# Patient Record
Sex: MALE | Employment: FULL TIME | URBAN - METROPOLITAN AREA
[De-identification: names, ages, dates, MRNs, and addresses within clinical notes are randomized per-mention and may not be internally consistent; named-entity substitution may affect disease eponyms.]

---

## 2024-09-01 ENCOUNTER — OFFICE VISIT (OUTPATIENT)
Dept: OPHTHALMOLOGY | Facility: CLINIC | Age: 59
End: 2024-09-01
Payer: COMMERCIAL

## 2024-09-01 DIAGNOSIS — S05.02XA ABRASION OF LEFT CORNEA, INITIAL ENCOUNTER: Primary | ICD-10-CM

## 2024-09-01 PROCEDURE — 92002 INTRM OPH EXAM NEW PATIENT: CPT | Performed by: OPHTHALMOLOGY

## 2024-09-01 NOTE — LETTER
9/1/2024      Pedro Coto  [address] [facility] [address]      Dear Colleague,    Thank you for referring your patient, Pedro Coto, to the [facility]. Please see a copy of my visit note below.     Current Eye Medications:  Ocuflox every 2 hours     Subjective:  Foreign body sensation yesterday AM with contact lens left eye.  At 2:30 AM increased pain and presented to [place] ED.  Corneal ulcer diagnosed.  I discussed with ED and arranged to meet patient today urgently.    Daily disposable CLs.  Delta ; next flies Weds 9/4.     Objective:  See Ophthalmology Exam.       Assessment:  Corneal abrasion left eye.  No sign of keratitis.      Plan:  Cyclogyl 1% one drop given left eye.  Moxifloxacin drop left eye four times daily.  Alternate with Erythromycin ointment four times daily left eye.  I will call tomorrow in follow up.    NB: Discussed with patient 9/2/24: Much improved.  Will taper meds and follow up with usual eyecare provider in 1-2 days.  Will call me if any problems.    Hiram Lora M.D.  [phone]      Again, thank you for allowing me to participate in the care of your patient.        Sincerely,        Hiram Lora MD

## 2024-09-14 ASSESSMENT — SLIT LAMP EXAM - LIDS: COMMENTS: NORMAL

## 2024-09-14 ASSESSMENT — VISUAL ACUITY
OD_CC: 20/15
METHOD: SNELLEN - LINEAR
OS_CC+: +2
OS_CC: 20/60

## 2024-09-14 ASSESSMENT — EXTERNAL EXAM - RIGHT EYE: OD_EXAM: NORMAL

## 2024-09-14 ASSESSMENT — EXTERNAL EXAM - LEFT EYE: OS_EXAM: NORMAL

## 2024-09-15 NOTE — PATIENT INSTRUCTIONS
Cyclogyl 1% one drop given left eye.  Moxifloxacin drop left eye four times daily.  Alternate with Erythromycin ointment four times daily left eye.  I will call tomorrow in follow up.    NB: Discussed with patient 9/2/24: Much improved.  Will taper meds and follow up with usual eyecare provider in 1-2 days.  Will call me if any problems.    Hiram Lora M.D.  [phone]

## 2024-09-15 NOTE — PROGRESS NOTES
Current Eye Medications:  Ocuflox every 2 hours     Subjective:  Foreign body sensation yesterday AM with contact lens left eye.  At 2:30 AM increased pain and presented to [place] ED.  Corneal ulcer diagnosed.  I discussed with ED and arranged to meet patient today urgently.    Daily disposable CLs.  Delta ; next flies Weds 9/4.     Objective:  See Ophthalmology Exam.       Assessment:  Corneal abrasion left eye.  No sign of keratitis.      Plan:  Cyclogyl 1% one drop given left eye.  Moxifloxacin drop left eye four times daily.  Alternate with Erythromycin ointment four times daily left eye.  I will call tomorrow in follow up.    NB: Discussed with patient 9/2/24: Much improved.  Will taper meds and follow up with usual eyecare provider in 1-2 days.  Will call me if any problems.    Hiram Lora M.D.  [phone]